# Patient Record
Sex: MALE | URBAN - METROPOLITAN AREA
[De-identification: names, ages, dates, MRNs, and addresses within clinical notes are randomized per-mention and may not be internally consistent; named-entity substitution may affect disease eponyms.]

---

## 2022-11-30 ENCOUNTER — OFFICE VISIT (OUTPATIENT)
Dept: URBAN - METROPOLITAN AREA CLINIC 30 | Facility: CLINIC | Age: 47
End: 2022-11-30
Payer: COMMERCIAL

## 2022-11-30 DIAGNOSIS — H16.142 PUNCTATE KERATITIS OF LEFT EYE: Primary | ICD-10-CM

## 2022-11-30 DIAGNOSIS — H52.4 PRESBYOPIA: ICD-10-CM

## 2022-11-30 PROCEDURE — 92004 COMPRE OPH EXAM NEW PT 1/>: CPT | Performed by: STUDENT IN AN ORGANIZED HEALTH CARE EDUCATION/TRAINING PROGRAM

## 2022-11-30 ASSESSMENT — KERATOMETRY
OS: 43.10
OD: 42.62

## 2022-11-30 ASSESSMENT — INTRAOCULAR PRESSURE
OS: 17
OD: 17

## 2022-11-30 ASSESSMENT — VISUAL ACUITY
OD: 20/20
OS: 20/20

## 2022-11-30 NOTE — IMPRESSION/PLAN
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 Plan: Dry eyes contribute to the patient's complaints. There is no evidence of permanent changes to the cornea. Explained condition does not have a cure, is a chronic condition and will need consistent artificial tears use for maintenance.  
Start art tears qid prn OU